# Patient Record
Sex: FEMALE | Race: WHITE | NOT HISPANIC OR LATINO | URBAN - METROPOLITAN AREA
[De-identification: names, ages, dates, MRNs, and addresses within clinical notes are randomized per-mention and may not be internally consistent; named-entity substitution may affect disease eponyms.]

---

## 2022-11-02 ENCOUNTER — EMERGENCY (EMERGENCY)
Facility: HOSPITAL | Age: 37
LOS: 0 days | Discharge: HOME | End: 2022-11-03
Attending: EMERGENCY MEDICINE

## 2022-11-02 VITALS
HEART RATE: 81 BPM | OXYGEN SATURATION: 98 % | RESPIRATION RATE: 17 BRPM | TEMPERATURE: 99 F | HEIGHT: 63 IN | WEIGHT: 190.04 LBS | DIASTOLIC BLOOD PRESSURE: 68 MMHG | SYSTOLIC BLOOD PRESSURE: 108 MMHG

## 2022-11-02 VITALS
HEART RATE: 73 BPM | TEMPERATURE: 98 F | RESPIRATION RATE: 16 BRPM | OXYGEN SATURATION: 97 % | DIASTOLIC BLOOD PRESSURE: 74 MMHG | SYSTOLIC BLOOD PRESSURE: 110 MMHG

## 2022-11-02 DIAGNOSIS — M25.561 PAIN IN RIGHT KNEE: ICD-10-CM

## 2022-11-02 DIAGNOSIS — M79.604 PAIN IN RIGHT LEG: ICD-10-CM

## 2022-11-02 PROCEDURE — 93971 EXTREMITY STUDY: CPT | Mod: 26,RT

## 2022-11-02 PROCEDURE — 73564 X-RAY EXAM KNEE 4 OR MORE: CPT | Mod: 26,RT

## 2022-11-02 PROCEDURE — 99284 EMERGENCY DEPT VISIT MOD MDM: CPT

## 2022-11-02 RX ORDER — TRAMADOL HYDROCHLORIDE 50 MG/1
50 TABLET ORAL ONCE
Refills: 0 | Status: DISCONTINUED | OUTPATIENT
Start: 2022-11-02 | End: 2022-11-02

## 2022-11-02 RX ORDER — TRAMADOL HYDROCHLORIDE 50 MG/1
1 TABLET ORAL
Qty: 12 | Refills: 0
Start: 2022-11-02 | End: 2022-11-04

## 2022-11-02 RX ADMIN — TRAMADOL HYDROCHLORIDE 50 MILLIGRAM(S): 50 TABLET ORAL at 23:20

## 2022-11-02 RX ADMIN — TRAMADOL HYDROCHLORIDE 50 MILLIGRAM(S): 50 TABLET ORAL at 22:25

## 2022-11-02 NOTE — ED ADULT NURSE REASSESSMENT NOTE - NS ED NURSE REASSESS COMMENT FT1
Received pt in bed AAOx4 breathing well on RA and in NAD< just returning from US. pt endorse slight relief from pain after initial medication. pain reduced from a 9/10 to an 8/10. pt still has pain to top of R/knee. said most of the pain to the back of the knee is gone after medication.

## 2022-11-02 NOTE — ED PROVIDER NOTE - PROGRESS NOTE DETAILS
Patient care endorsed by Dr. Knapp pending US of knee for nontraumatic pain present weeks.  XR, US neg, no concern for septic joint, ace wrap, RICE treatment, follow up with ortho d/w patient.

## 2022-11-02 NOTE — ED PROVIDER NOTE - MUSCULOSKELETAL, MLM
Spine appears normal, mild tenderness noted on the knee and thigh without any limitations on range of motion, slight swelling noted around right knee joint, no focal joint tenderness or erythema.

## 2022-11-02 NOTE — ED PROVIDER NOTE - OBJECTIVE STATEMENT
Pt is a 37 year old female with no relevant PMHx who presents to the ER today complaining of right knee and leg pain. Pt states she did not fall nor did she suffer any trauma after which the pain began. Pt states that for the last two days, the pain was localized to the top of her knee/leg. Today, she was laying in bed, and within an hour of laying, she states she started feeling severe stiffness in the back as well, and it would not go away with time.

## 2022-11-02 NOTE — ED ADULT NURSE NOTE - OBJECTIVE STATEMENT
Pt c/o right knee pain, swelling & stiffness, limited ROM since Monday (10/31/22), denies trauma to site, SOB, CP. Pt AOx4, responsive, ambulatory. c/o right knee pain, swelling & stiffness, limited ROM since Monday (10/31/22), denies trauma to site, SOB, CP. LMP was 6 years ago; states she's not pregnant

## 2022-11-02 NOTE — ED PROVIDER NOTE - NORMAL, MLM
nurys all pertinent systems normal [employer] Pregnancy And Lactation Text: This medication is Pregnancy Category B and is considered safe. There is some data to suggest avoiding during the third trimester. It is unknown if this medication is excreted in breast milk.

## 2022-11-02 NOTE — ED PROVIDER NOTE - CLINICAL SUMMARY MEDICAL DECISION MAKING FREE TEXT BOX
Pt with right leg pain behind knee. Otherwise will evaluate with X-ray and ultrasound to rule out DVT. Pt with right leg pain behind knee. Otherwise will evaluate with X-ray and ultrasound to rule out DVT. Pt signed out to Dr Webb pending US results.

## 2022-11-02 NOTE — ED PROVIDER NOTE - PATIENT PORTAL LINK FT
You can access the FollowMyHealth Patient Portal offered by [facility] by registering at the following website: [email or web address]. By joining [facility]’s FollowMyHealth portal, you will also be able to view your health information using other applications (apps) compatible with our system.

## 2022-11-03 NOTE — ED ADULT NURSE REASSESSMENT NOTE - NS ED NURSE REASSESS COMMENT FT1
Pt is d/c able to ambulate home with steady gait and with friend in company. pt is breathing well, has no other complaints, still has some pain to the front side of the knee.

## 2024-04-11 NOTE — ED PROVIDER NOTE - IV ALTEPLASE EXCL REL HIDDEN
[FreeTextEntry1] : 53F postemenopausal, no other significant PMH, presenting with acute onset joint pain for past month, affecting all joints, b/l shoudlers, elbows, wrists, knees, ankles. Pain started in R. groin initially. No joint swelling (but notes generalized b/l hand and foot swelling) and no significant joint stiffness apart from b/l shoulders and back.   Will check tests for autoimmune conditions including SLE, Sjogrens, RA; will also check TSH and tests for infectious etiologies including parvovirus (which can cause acute joint pain, although patient's children are all older, the youngest is 13 years old) and lyme disease (dogs were found to have ticks recently from walking in the park, but patient did not notice ticks on herself).   Further plan to follow pending results of the above tests.  show

## 2024-07-21 NOTE — ED ADULT TRIAGE NOTE - WEIGHT IN KG
Pacing system analysis was completed on the right atrial and right ventricle lead with the following measurements validated: 86.2

## 2025-05-02 NOTE — ED ADULT TRIAGE NOTE - MEANS OF ARRIVAL
ambulatory [FreeTextEntry1] : fuv - seen by me for fibromyalgia. Takes Gabapentin TID, muscle relaxer PRN Today we spend much of the visit discussing the following:  Pt was recently diagnosed with breast cancer stage 1 - did not have chemotherapy Pt double mastectomy with LN dissection w/ reconstruction/tissue expanders about 1 month ago feels pain, tightness in the expanders pending initiation of physical therapy and determination if XRT is needed along w/ tamoxifen We had an extended conversation about the diagnosis, her ongoing and upcoming treatments, as well as implications and her feelings about having cancer.    from last visit Pt has history of FMS and foot pain.  Pt is undergoing PT right now and finds it helpful.   She was prescribed a mm relaxant which she takes 1-2 tablets per day with good response.  She was prescribed 7.5 mg of cyclobenzaprine but that was not covered.  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